# Patient Record
Sex: FEMALE | Race: BLACK OR AFRICAN AMERICAN | NOT HISPANIC OR LATINO | URBAN - METROPOLITAN AREA
[De-identification: names, ages, dates, MRNs, and addresses within clinical notes are randomized per-mention and may not be internally consistent; named-entity substitution may affect disease eponyms.]

---

## 2017-07-19 ENCOUNTER — EMERGENCY (EMERGENCY)
Facility: HOSPITAL | Age: 37
LOS: 0 days | Discharge: HOME | End: 2017-07-20
Attending: EMERGENCY MEDICINE
Payer: COMMERCIAL

## 2017-07-19 VITALS
HEIGHT: 66 IN | OXYGEN SATURATION: 100 % | DIASTOLIC BLOOD PRESSURE: 80 MMHG | RESPIRATION RATE: 19 BRPM | SYSTOLIC BLOOD PRESSURE: 127 MMHG | WEIGHT: 139.99 LBS | HEART RATE: 81 BPM | TEMPERATURE: 98 F

## 2017-07-19 PROCEDURE — 99284 EMERGENCY DEPT VISIT MOD MDM: CPT
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
## 2017-07-20 VITALS
HEART RATE: 71 BPM | SYSTOLIC BLOOD PRESSURE: 123 MMHG | TEMPERATURE: 98 F | RESPIRATION RATE: 20 BRPM | OXYGEN SATURATION: 100 % | DIASTOLIC BLOOD PRESSURE: 79 MMHG

## 2017-07-20 LAB — HCG UR QL: NEGATIVE — SIGNIFICANT CHANGE UP

## 2017-07-20 PROCEDURE — 71020: CPT | Mod: 26

## 2017-07-20 PROCEDURE — 71120 X-RAY EXAM BREASTBONE 2/>VWS: CPT | Mod: 26

## 2017-07-20 NOTE — ED PROVIDER NOTE - MEDICAL DECISION MAKING DETAILS
VSS.  ABCs intact.  CXR and sternal views obtained, no evidence of fracture, pneumothorax, mediastinum normal.  No significant sternal tenderness to warrant CT.  Chest wall pain is very mild, do not suspect pulmonary contusion or myocardial injury.  Patient was examined head to toe and screened for additional injuries.  Based on patient's history and physical exam, there are no apparent indications for further emergent labs, imaging, or additional diagnostics at this time. Discussed results and outcome of today's visit with the patient.  Patient advised to please follow up with their primary care doctor within the next 24 hours and return to the Emergency Department for worsening symptoms or any other concerns.  Patient advised that their doctor may call  to follow up on the specific results of the tests performed today in the emergency department.   Patient appears well on discharge. VSS.  ABCs intact.  CXR and sternal views obtained, no evidence of fracture, pneumothorax, mediastinum normal.  No significant sternal tenderness to warrant CT.  Chest wall pain is very mild and upper, do not suspect pulmonary contusion or myocardial injury.  Patient was examined head to toe and screened for additional injuries.  Based on patient's history and physical exam, there are no apparent indications for further emergent labs, imaging, or additional diagnostics at this time. Discussed results and outcome of today's visit with the patient.  Patient advised to please follow up with their primary care doctor within the next 24 hours and return to the Emergency Department for worsening symptoms or any other concerns.  Patient advised that their doctor may call  to follow up on the specific results of the tests performed today in the emergency department.   Patient appears well on discharge.
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
## 2017-07-21 DIAGNOSIS — R07.89 OTHER CHEST PAIN: ICD-10-CM

## 2017-07-21 DIAGNOSIS — R07.9 CHEST PAIN, UNSPECIFIED: ICD-10-CM
